# Patient Record
Sex: MALE | Race: WHITE | NOT HISPANIC OR LATINO
[De-identification: names, ages, dates, MRNs, and addresses within clinical notes are randomized per-mention and may not be internally consistent; named-entity substitution may affect disease eponyms.]

---

## 2017-07-27 PROBLEM — Z00.00 ENCOUNTER FOR PREVENTIVE HEALTH EXAMINATION: Status: ACTIVE | Noted: 2017-07-27

## 2017-07-28 ENCOUNTER — APPOINTMENT (OUTPATIENT)
Dept: MRI IMAGING | Facility: CLINIC | Age: 32
End: 2017-07-28

## 2019-06-16 ENCOUNTER — TRANSCRIPTION ENCOUNTER (OUTPATIENT)
Age: 34
End: 2019-06-16

## 2023-04-04 ENCOUNTER — APPOINTMENT (OUTPATIENT)
Dept: SURGERY | Facility: CLINIC | Age: 38
End: 2023-04-04
Payer: COMMERCIAL

## 2023-04-04 VITALS
BODY MASS INDEX: 40.09 KG/M2 | HEART RATE: 98 BPM | TEMPERATURE: 97.2 F | OXYGEN SATURATION: 99 % | DIASTOLIC BLOOD PRESSURE: 83 MMHG | WEIGHT: 280 LBS | HEIGHT: 70 IN | RESPIRATION RATE: 16 BRPM | SYSTOLIC BLOOD PRESSURE: 130 MMHG

## 2023-04-04 DIAGNOSIS — Z87.19 PERSONAL HISTORY OF OTHER DISEASES OF THE DIGESTIVE SYSTEM: ICD-10-CM

## 2023-04-04 DIAGNOSIS — Z72.0 TOBACCO USE: ICD-10-CM

## 2023-04-04 DIAGNOSIS — Z86.79 PERSONAL HISTORY OF OTHER DISEASES OF THE CIRCULATORY SYSTEM: ICD-10-CM

## 2023-04-04 DIAGNOSIS — R10.9 UNSPECIFIED ABDOMINAL PAIN: ICD-10-CM

## 2023-04-04 DIAGNOSIS — Z78.9 OTHER SPECIFIED HEALTH STATUS: ICD-10-CM

## 2023-04-04 PROCEDURE — 99203 OFFICE O/P NEW LOW 30 MIN: CPT

## 2023-04-04 RX ORDER — AMLODIPINE BESYLATE AND OLMESARTAN MEDOXOMIL 10; 40 MG/1; MG/1
TABLET, FILM COATED ORAL
Refills: 0 | Status: ACTIVE | COMMUNITY

## 2023-04-04 RX ORDER — OMEPRAZOLE 40 MG/1
40 CAPSULE, DELAYED RELEASE ORAL
Refills: 0 | Status: ACTIVE | COMMUNITY

## 2023-04-04 NOTE — PHYSICAL EXAM
[Normal Breath Sounds] : Normal breath sounds [Normal Heart Sounds] : normal heart sounds [No Rash or Lesion] : No rash or lesion [Alert] : alert [Oriented to Person] : oriented to person [Oriented to Place] : oriented to place [Oriented to Time] : oriented to time [Calm] : calm [No HSM] : no hepatosplenomegaly [de-identified] : WNL [de-identified] : WNL [de-identified] : NENAL [de-identified] : Obese, soft, nontender, no palpable masses or hernias.  No Jacobson sign [de-identified] : WNL [de-identified] : WNL no jaundice or scleral icterus

## 2023-04-04 NOTE — HISTORY OF PRESENT ILLNESS
[de-identified] : Joseph is a 36 y/o male here for a consultation visit, gallbladder. \par \par PMH: Cholelithiasis\par \par US Abdomen on 4/16/21 - Gallbladder is adequately visualized. Gallbladder wall is 0.08 cm thick. Jacobson's sign is negative. No polyps are present. Mobile cholelithiasis is present. No sludge is present. \par \par Two years ago patient had epigastric pain, on and off.  In the past few months steady pain for couple of hours, that comes back after few days (happens every once or two weeks).   The pain does not radiate.   3-4 weeks ago seen by Dr. Diaz for Endoscopy to r/o stomach problem and referred to Dr. Stoddard.  Patient with h/o acid reflux takes Omeprazole.  No abdominal surgery history.  No family history of gallbladder problems or cancer.  Patient is not on any ACG.

## 2023-04-04 NOTE — CONSULT LETTER
[Dear  ___] : Dear  [unfilled], [Consult Letter:] : I had the pleasure of evaluating your patient, [unfilled]. [Please see my note below.] : Please see my note below. [Consult Closing:] : Thank you very much for allowing me to participate in the care of this patient.  If you have any questions, please do not hesitate to contact me. [Sincerely,] : Sincerely, [FreeTextEntry3] : Eduin Stoddard M.D., F.A.C.S, F.A.S.C.R.S [DrMaurice  ___] : Dr. STEEN

## 2023-04-04 NOTE — ASSESSMENT
[FreeTextEntry1] : In summary the patient has symptomatic cholelithiasis.  I ordered a repeat ultrasound as he has not had 1 in 2 years.  I also ordered repeat LFTs.  Assuming that these show n acute abnormalities I recommended a laparoscopic cholecystectomy.  I explained the risks benefits and alternatives of surgery including the risks of bleeding infection the possible need for an open procedure and the rare incidence of bile duct injury.

## 2023-05-08 ENCOUNTER — NON-APPOINTMENT (OUTPATIENT)
Age: 38
End: 2023-05-08

## 2023-05-11 ENCOUNTER — NON-APPOINTMENT (OUTPATIENT)
Age: 38
End: 2023-05-11